# Patient Record
Sex: FEMALE | Race: WHITE
[De-identification: names, ages, dates, MRNs, and addresses within clinical notes are randomized per-mention and may not be internally consistent; named-entity substitution may affect disease eponyms.]

---

## 2017-01-10 ENCOUNTER — HOSPITAL ENCOUNTER (EMERGENCY)
Dept: HOSPITAL 25 - UCCORT | Age: 52
Discharge: HOME | End: 2017-01-10
Payer: COMMERCIAL

## 2017-01-10 VITALS — SYSTOLIC BLOOD PRESSURE: 149 MMHG | DIASTOLIC BLOOD PRESSURE: 97 MMHG

## 2017-01-10 DIAGNOSIS — Z88.7: ICD-10-CM

## 2017-01-10 DIAGNOSIS — J32.9: Primary | ICD-10-CM

## 2017-01-10 DIAGNOSIS — Z87.891: ICD-10-CM

## 2017-01-10 PROCEDURE — 99212 OFFICE O/P EST SF 10 MIN: CPT

## 2017-01-10 PROCEDURE — G0463 HOSPITAL OUTPT CLINIC VISIT: HCPCS

## 2017-01-10 NOTE — UC
Throat Pain/Nasal Jeevan HPI





- HPI Summary


HPI Summary: 





complaint of nasal congestion for approx 9 days


3 daysa go started to have sinus pressure 


for the last 2 days feels like her face is swollen, lip swelling cheek swelling


 her left eye is now watery 


cough has become productive and hurts her chest when she is coughing


 intermittent headache


 hasn't needed to use rescue inhaler during this illness


 denies fever and chills


taking dayquil and ibuprofen benadryl with some relief





- History of Current Complaint


Stated Complaint: SINUSES


Time Seen by Provider: 01/10/17 15:59


Hx Obtained From: Patient


Hx Last Menstrual Period: 2014





- Allergies/Home Medications


Allergies/Adverse Reactions: 


 Allergies











Allergy/AdvReac Type Severity Reaction Status Date / Time


 


Tetanus Toxoid Allergy Severe Anaphylatic Verified 01/10/17 16:03





   Shock  


 


Lactose Intolerance Allergy Mild Hives Verified 01/10/17 16:03











Home Medications: 


 Home Medications





Diphenhydramine HCl [Benadryl Allergy] 25 mg PO Q4H PRN 01/10/17 [History 

Confirmed 01/10/17]


Ranitidine HCl [Zantac] 150 mg PO DAILY 01/10/17 [History Confirmed 01/10/17]











PMH/Surg Hx/FS Hx/Imm Hx


Previously Healthy: No - URI


Endocrine History Of: Reports: Thyroid Disease - Hypothyroidism


Cardiovascular History Of: Reports: Cardiac Disorders - elevated hr, 

Hypertension


Respiratory History Of: Reports: Asthma





- Surgical History


Surgical History: Yes


Surgery Procedure, Year, and Place: T&A. D&C AFTER MISCARRAGE.  esophageal 

biopsy and stretching.





- Family History


Known Family History: Positive: Cardiac Disease, Hypertension


   Negative: Diabetes





- Social History


Occupation: Unemployed


Lives: With Family


Alcohol Use: None


Substance Use Type: None


Smoking Status (MU): Former Smoker


Type: Cigarettes


Amount Used/How Often: "a couple times a year"


Length of Time of Smoking/Using Tobacco: 3 Years


Have You Smoked in the Last Year: Yes


Household Exposure Type: Cigarettes





- Immunization History


Most Recent Influenza Vaccination: Not the 2016/2017 Season


Most Recent Tetanus Shot: allergic





Review of Systems


Constitutional: Negative


Skin: Negative


Eyes: Negative


ENT: Nasal Discharge


Respiratory: Cough


Cardiovascular: Negative


Gastrointestinal: Negative


Genitourinary: Negative


Motor: Negative


Neurovascular: Negative


Musculoskeletal: Negative


Neurological: Headache


Psychological: Negative


All Other Systems Reviewed And Are Negative: Yes





Physical Exam


Triage Information Reviewed: Yes


Appearance: No Pain Distress, Well-Nourished, Ill-Appearing


Vital Signs Reviewed: Yes


Eyes: Positive: Conjunctiva Clear


ENT: Positive: Pharyngeal erythema, Nasal congestion, Nasal drainage, TM bulging

, Other: - left sided maxillary and frontal sinus tenderness.  Negative: TM red


Neck: Positive: Supple, No Lymphadenopathy


Respiratory: Positive: Lungs clear, Normal breath sounds, No respiratory 

distress


Cardiovascular: Positive: RRR, No Murmur, Pulses Normal


Abdomen Description: Positive: Nontender, Soft


Bowel Sounds: Positive: Present


Musculoskeletal: Positive: No Edema


Neurological: Positive: Alert


Psychological Exam: Normal


Skin Exam: Normal





Throat Pain/Nasal Course/Dx





- Course


Course Of Treatment: exam completed.  pt refuses augmentin d/t episode of 

diarrhea in the past





- Differential Dx/Diagnosis


Differential Diagnosis/HQI/PQRI: Sinusitis, URI


Provider Diagnoses: sinusitis





Discharge





- Discharge Plan


Condition: Stable


Disposition: HOME


Prescriptions: 


DOXYcycline CAP(*) [DOXYcycline 100MG CAP(*)] 100 mg PO BID #20 cap


Patient Education Materials:  Sinusitis (ED)


Referrals: 


YOUNG Vargas [Primary Care Provider] - 


Additional Instructions: 


Please take antibiotic as directed.


 Increase fluids and rest


Take acetaminophen for fever or pain


Please review your discharge instructions.


 If your symptoms do not improve please call your primary care provider or 

return to urgent care





SINUSITIS 


What is Sinusitis? 


Sinusitis is inflammation or infection of the lining of the sinuses behind the 

bones in your cheeks or forehead. Sinusitis may occur following a common cold, 

flu, or other infection; allergies; a tooth infection that spreads to the 

sinuses; swimming in contaminated water; pressure changes in airplanes at high 

altitudes; violent sneezing or nose blowing or smoking or breathing other 

peoples smoke. 


Symptoms Might Include: 


 Nasal Congestion 


 Sneezing 


 Watery eyes, eye irritation, or eye itching 


 Headaches 


 Pressure in the cheeks 


 Wheezing 


 Trouble smelling 


 Sore throat and coughing may occur 





Treatment Recommendations: 


 Take medicines as prescribed until completely gone. 


 Drink plenty of fluids. 


 Use saline nose spray to thin the mucous and help the sinuses drain. 


 Use a vaporizer or humidifier. 


 Apply warm compresses to the face or forehead several times a day for 10 to 20 

minutes. 





Call Your Doctor or Return Here IF: 


 Your pain increases during treatment. 


 You develop a high temperature. 


 You develop unusual swelling around the eyes.  You have difficulty with your 

vision. 


 You develop a severe headache, earache, or toothache. 


 You develop increased fever or fever that does not respond to medication such 

as Tylenol?. 


 You have difficulty breathing or catching your breath. 


 You begin to have any other new symptoms that worry you.

## 2017-01-25 ENCOUNTER — HOSPITAL ENCOUNTER (EMERGENCY)
Dept: HOSPITAL 25 - UCCORT | Age: 52
Discharge: HOME | End: 2017-01-25
Payer: COMMERCIAL

## 2017-01-25 VITALS — DIASTOLIC BLOOD PRESSURE: 73 MMHG | SYSTOLIC BLOOD PRESSURE: 156 MMHG

## 2017-01-25 DIAGNOSIS — J45.909: ICD-10-CM

## 2017-01-25 DIAGNOSIS — J06.9: Primary | ICD-10-CM

## 2017-01-25 DIAGNOSIS — Z72.0: ICD-10-CM

## 2017-01-25 PROCEDURE — 99212 OFFICE O/P EST SF 10 MIN: CPT

## 2017-01-25 PROCEDURE — G0463 HOSPITAL OUTPT CLINIC VISIT: HCPCS

## 2017-01-25 NOTE — UC
Asthma HPI





- HPI Summary


HPI Summary: 





states she has asthma, and flaring up today. Notes SOB going up stairs. No 

audible wheezing "but I never get wheezing. They just tell me I have asthma." 

Uses nebs and inhalers prn. REcent course of doxycycline for sinusitis. No 

fever. No ST. No sinus congestion. No recent lung testing to verify diagnosis. 

No hospital admissions.





- History of Current Complaint


Chief Complaint: UCRespiratory


Stated Complaint: TROUBLE BREATHING-ASTHMA


Time Seen by Provider: 01/25/17 17:40


Hx Obtained From: Patient


Hx Last Menstrual Period: ~1.5 yrs


Onset/Duration: Gradual Onset, Lasting Days - 2


Timing: Intermittent Episode Lasting - minutes


Initial Severity: Mild


Current Severity: Mild


Aggravating: Exertion


Alleviating: Rest, Inhalers/Nebulizers





- Risk Factors


Status Asthmaticus Risk Factors: Negative





- Allergy/Home Medications


Allergies/Adverse Reactions: 


 Allergies











Allergy/AdvReac Type Severity Reaction Status Date / Time


 


Tetanus Toxoid Allergy Severe Anaphylatic Verified 01/25/17 17:16





   Shock  


 


Lactose Intolerance Allergy Mild Hives Verified 01/25/17 17:16














PMH/Surg Hx/FS Hx/Imm Hx


Endocrine History Of: Reports: Thyroid Disease - Hypothyroidism


Cardiovascular History Of: Reports: Cardiac Disorders - elevated hr, 

Hypertension


Respiratory History Of: Reports: Asthma





- Surgical History


Surgical History: Yes


Surgery Procedure, Year, and Place: T&A. D&C AFTER MISCARRAGE.  esophageal 

biopsy and stretching.





- Family History


Known Family History: Positive: Cardiac Disease, Hypertension


   Negative: Diabetes





- Social History


Occupation: Employed Part-time


Lives: With Family


Alcohol Use: None


Substance Use Type: None


Smoking Status (MU): Never Smoked Tobacco


Type: Cigarettes


Amount Used/How Often: "a couple times a year"


Length of Time of Smoking/Using Tobacco: 3 Years


Have You Smoked in the Last Year: Yes


Household Exposure Type: Cigarettes





- Immunization History


Most Recent Influenza Vaccination: Not the 2016/2017 Season


Most Recent Tetanus Shot: allergic





Review of Systems


Constitutional: Negative


Skin: Negative


Eyes: Negative


ENT: Negative


Respiratory: Shortness Of Breath, Cough - rare, dry


Cardiovascular: Negative


Gastrointestinal: Negative


Genitourinary: Negative


Motor: Negative


Neurovascular: Negative


Musculoskeletal: Negative


Neurological: Negative


Psychological: Negative


All Other Systems Reviewed And Are Negative: Yes





Physical Exam


Triage Information Reviewed: Yes


Appearance: Well-Appearing, No Pain Distress, Well-Nourished


Vital Signs: 


 Initial Vital Signs











Temp  97.7 F   01/25/17 17:09


 


Pulse  85   01/25/17 17:09


 


Resp  18   01/25/17 17:09


 


BP  156/73   01/25/17 17:09


 


Pulse Ox  97   01/25/17 17:09











Vital Signs Reviewed: Yes


Eye Exam: Normal


ENT Exam: Normal


Neck exam: Normal


Respiratory Exam: Normal


Respiratory: Positive: Lungs clear, Normal breath sounds, No respiratory 

distress, No accessory muscle use


Cardiovascular Exam: Normal


Cardiovascular: Positive: RRR


Musculoskeletal Exam: Normal


Neurological Exam: Normal


Psychological Exam: Normal


Skin Exam: Normal





Asthma Course/Dx





- Differential Dx/Diagnosis


Differential Diagnosis/HQI/PQRI: Acute Asthma, Bronchitis


Provider Diagnoses: URI





Discharge





- Discharge Plan


Condition: Stable


Disposition: HOME


Prescriptions: 


Albuterol HFA INHALER* [Ventolin HFA Inhaler*] 1 - 2 puff INH Q6H PRN #1 mdi


 PRN Reason: Wheezing


Albuterol SYRUP* [Proventyl Syrup*] 4 mg PO QID PRN #200 btl


 PRN Reason: Cough


Levalbuterol 0.63MG/3ML NEB* [Xopenex 0.63MG/3ML NEB*] 0.63 mg INH Q6H PRN #1 

box


 PRN Reason: wheezing, shortness of breath


Patient Education Materials:  Upper Respiratory Infection (ED)


Referrals: 


YOUNG Vargas [Primary Care Provider] -

## 2017-09-01 ENCOUNTER — HOSPITAL ENCOUNTER (EMERGENCY)
Dept: HOSPITAL 25 - UCCORT | Age: 52
Discharge: HOME | End: 2017-09-01
Payer: COMMERCIAL

## 2017-09-01 VITALS — SYSTOLIC BLOOD PRESSURE: 142 MMHG | DIASTOLIC BLOOD PRESSURE: 68 MMHG

## 2017-09-01 DIAGNOSIS — J45.901: Primary | ICD-10-CM

## 2017-09-01 DIAGNOSIS — M79.7: ICD-10-CM

## 2017-09-01 DIAGNOSIS — E03.9: ICD-10-CM

## 2017-09-01 DIAGNOSIS — Z72.0: ICD-10-CM

## 2017-09-01 DIAGNOSIS — I10: ICD-10-CM

## 2017-09-01 DIAGNOSIS — J01.90: ICD-10-CM

## 2017-09-01 PROCEDURE — G0463 HOSPITAL OUTPT CLINIC VISIT: HCPCS

## 2017-09-01 PROCEDURE — 99212 OFFICE O/P EST SF 10 MIN: CPT

## 2017-09-01 NOTE — UC
Respiratory Complaint HPI





- HPI Summary


HPI Summary: 


2 weeks of sinus pain and pressure--now with worsening bronchospastic cough has 

run out of albuterol SNV








- History of Current Complaint


Chief Complaint: UCRespiratory


Stated Complaint: COUGH/ASTHMA


Time Seen by Provider: 09/01/17 11:19


Hx Obtained From: Patient


Hx Last Menstrual Period: ~1.5 yrs


Pregnant?: No


Onset/Duration: Gradual Onset, Lasting Weeks - 2, Worse Since - past fe days 

cough has been worsening


Timing: Constant


Severity Initially: Mild


Severity Currently: Moderate


Pain Intensity: 4


Pain Scale Used: 0-10 Numeric


Character: Cough: Nonproductive


Aggravating Factors: Nothing


Alleviating Factors: Bronchodilator


Associated Signs And Symptoms: Positive: Chills, URI, Nasal Congestion, Sinus 

Discomfort





- Allergies/Home Medications


Allergies/Adverse Reactions: 


 Allergies











Allergy/AdvReac Type Severity Reaction Status Date / Time


 


Tetanus Toxoid Allergy Severe Anaphylatic Verified 09/01/17 11:21





   Shock  


 


Lactose Intolerance Allergy Mild Hives Verified 09/01/17 11:21


 


Latex Allergy  "itchy, Verified 09/01/17 11:21





   red rona"  











Home Medications: 


 Home Medications





Albuterol HFA INHALER* [Ventolin HFA Inhaler*] 1 - 2 puff INH Q4H PRN 09/01/17 [

History Confirmed 09/01/17]


Ibuprofen TAB* [Advil TAB*] 600 mg PO Q6H PRN 09/01/17 [History Confirmed 09/01/ 17]


Levothyroxine TAB* [Synthroid 150 MCG TAB*] 150 mcg PO DAILY 09/01/17 [History 

Confirmed 09/01/17]


Metoprolol Tartrate TAB* [Lopressor TAB*] 25 mg PO DAILY 09/01/17 [History 

Confirmed 09/01/17]


Paroxetine CR (NF) [Paxil Cr (NF)] 25 mg PO DAILY 09/01/17 [History Confirmed 09 /01/17]


Phenylephrine-Acetaminophen-Gu [Mucinex Fast-Max Cold & S 5-325-200 mg] 1 tab 

PO Q4H PRN 09/01/17 [History Confirmed 09/01/17]


Pregabalin CAP(*) [Lyrica CAP(*)] 100 mg PO TID 09/01/17 [History Confirmed 09/ 01/17]











PMH/Surg Hx/FS Hx/Imm Hx


Previously Healthy: No


Endocrine History: Hypothyroidism


Cardiovascular History: Hypertension


Respiratory History: Asthma


Neurological History: Other


Other Neurological History: Fibromyalgia





- Surgical History


Surgical History: Yes


Surgery Procedure, Year, and Place: T&A. D&C AFTER MISCARRAGE.  esophageal 

biopsy and stretching.





- Family History


Known Family History: Positive: Cardiac Disease, Hypertension


   Negative: Diabetes





- Social History


Occupation: Unemployed


Lives: With Family


Alcohol Use: None


Substance Use Type: None


Smoking Status (MU): Former Smoker


Type: Cigarettes


Amount Used/How Often: "a couple times a year"


Length of Time of Smoking/Using Tobacco: 3 Years


Have You Smoked in the Last Year: Yes


Household Exposure Type: Cigarettes





- Immunization History


Most Recent Influenza Vaccination: Not the 2017/208 Season


Most Recent Tetanus Shot: allergic





Review of Systems


Constitutional: Chills, Fatigue


Skin: Negative


Eyes: Negative


ENT: Nasal Discharge, Sinus Congestion, Sinus Pain/Tenderness


Respiratory: Cough


Cardiovascular: Negative


Gastrointestinal: Negative


Genitourinary: Negative


Motor: Negative


Neurovascular: Negative


Musculoskeletal: Negative


Neurological: Headache


Psychological: Negative


All Other Systems Reviewed And Are Negative: Yes





Physical Exam


Triage Information Reviewed: Yes


Appearance: No Pain Distress, Ill-Appearing - mild, Obese


Vital Signs Reviewed: Yes


Eye Exam: Normal


Eyes: Positive: Conjunctiva Clear


ENT Exam: Other


ENT: Positive: Normal ENT inspection, Hearing grossly normal, Pharynx normal, 

Nasal congestion, Nasal drainage.  Negative: TMs normal, Tonsillar swelling, 

Tonsillar exudate, Trismus, Muffled/hoarse voice


Dental Exam: Normal


Neck exam: Normal


Neck: Positive: Supple, Nontender, No Lymphadenopathy


Respiratory Exam: Normal


Respiratory: Positive: Chest non-tender, Lungs clear, Normal breath sounds, No 

respiratory distress, No accessory muscle use


Cardiovascular Exam: Normal


Cardiovascular: Positive: RRR, No Murmur, Pulses Normal, Brisk Capillary Refill


Musculoskeletal Exam: Normal


Musculoskeletal: Positive: Strength Intact, ROM Intact, No Edema


Neurological Exam: Normal


Neurological: Positive: Alert, Muscle Tone Normal


Psychological Exam: Normal


Skin Exam: Normal





Re-Evaluation





- Re-Evaluation


  ** First Eval


Change: Improved - increase airmovement , decrease cough, subjectively feeling 

better as well after neb





Respiratory Course/Dx





- Course


Course Of Treatment: refil Albuterol Neb, augmentin and flonase for sinus 

infection, follow with pcp for blood pressure management





- Differential Dx/Diagnosis


Differential Diagnosis/HQI/PQRI: Asthma, Bronchitis, Exacerbation Of COPD, 

Laryngitis, Sinusitis, Tuberculosis


Provider Diagnoses: Acute exacerbation of bronchospasm, acute rhinosinusitis, 

hypertension in poor control





Discharge





- Discharge Plan


Condition: Stable


Disposition: HOME


Prescriptions: 


Albuterol 2.5MG/3ML (0.083%)* [Ventolin 2.5 MG/3 ML NEB.SOL*] 2.5 mg INH Q6H 

PRN #1 box


 PRN Reason: cough, wheeze


Amoxicillin/Clavulanate TAB* [Augmentin *] 875 mg PO BID #20 tab


Fluticasone NASAL SPRAY 50MCG* [Flonase NASAL SPRAY 50MCG*] 2 spray BOTH NARES 

DAILY #1 btl


Patient Education Materials:  Rhinosinusitis (ED), Hypertension (ED), 

Bronchospasm (ED)


Referrals: 


YOUNG Vargas [Primary Care Provider] - 2 Weeks

## 2018-10-14 ENCOUNTER — HOSPITAL ENCOUNTER (EMERGENCY)
Dept: HOSPITAL 25 - UCCORT | Age: 53
Discharge: HOME | End: 2018-10-14
Payer: COMMERCIAL

## 2018-10-14 VITALS — DIASTOLIC BLOOD PRESSURE: 70 MMHG | SYSTOLIC BLOOD PRESSURE: 141 MMHG

## 2018-10-14 DIAGNOSIS — E07.9: ICD-10-CM

## 2018-10-14 DIAGNOSIS — J02.9: Primary | ICD-10-CM

## 2018-10-14 DIAGNOSIS — Z87.891: ICD-10-CM

## 2018-10-14 PROCEDURE — 99212 OFFICE O/P EST SF 10 MIN: CPT

## 2018-10-14 PROCEDURE — G0463 HOSPITAL OUTPT CLINIC VISIT: HCPCS

## 2018-10-14 PROCEDURE — 87070 CULTURE OTHR SPECIMN AEROBIC: CPT

## 2018-10-14 PROCEDURE — 87651 STREP A DNA AMP PROBE: CPT

## 2018-10-14 NOTE — UC
UC General HPI





- HPI Summary


HPI Summary: 





pt is c/o a sore throat, swollen glands and fever to 103.


onset 2 days ago.





- History of Current Complaint


Chief Complaint: UCGeneralIllness


Stated Complaint: FEVER SORE THROAT


Time Seen by Provider: 10/14/18 12:03


Hx Obtained From: Patient, Family/Caretaker


Hx Last Menstrual Period: ~1.5 yrs


Onset/Duration: Gradual Onset


Timing: Constant


Pain Intensity: 6


Associated Signs & Symptoms: Positive: Fever, Headache





- Allergy/Home Medications


Allergies/Adverse Reactions: 


 Allergies











Allergy/AdvReac Type Severity Reaction Status Date / Time


 


lactose Allergy  Hives Verified 10/14/18 11:43


 


latex Allergy  "Itchy, Verified 10/14/18 11:43





   Red Inocencio"  


 


Tetanus Vaccines and Toxoid Allergy  Anaphylatic Verified 10/14/18 11:43





   Shock  











Home Medications: 


 Home Medications





ASA-APAP-Caffeine Es (Nf) [Excedrin Extra Strength 250-250-65 mg (NF)] 2 tab PO 

Q6H PRN 10/14/18 [History Confirmed 10/14/18]


Levothyroxine TAB* [Synthroid TAB*] 137 mcg PO DAILY 10/14/18 [History 

Confirmed 10/14/18]











PMH/Surg Hx/FS Hx/Imm Hx





- Additional Past Medical History


Additional PMH: 





fibromyalgia


Endocrine History: Thyroid Disease


Cardiovascular History: Hypertension


Psychological History: Anxiety





- Surgical History


Surgical History: Yes


Surgery Procedure, Year, and Place: T&A. D&C AFTER MISCARRAGE.  esophageal 

biopsy and stretching.





- Family History


Known Family History: Positive: Cardiac Disease, Hypertension


   Negative: Diabetes





- Social History


Lives: With Family


Alcohol Use: None


Substance Use Type: None


Smoking Status (MU): Former Smoker


Type: Cigarettes


Amount Used/How Often: "a couple times a year"


Length of Time of Smoking/Using Tobacco: 3 Years


Have You Smoked in the Last Year: Yes


Household Exposure Type: Cigarettes





- Immunization History


Most Recent Influenza Vaccination: Not the 2017/208 Season


Most Recent Tetanus Shot: allergic


Vaccination Up to Date: Yes





Review of Systems


Constitutional: Fever, Fatigue


Skin: Negative


Eyes: Negative


ENT: Sore Throat


Respiratory: Negative


Cardiovascular: Negative


Gastrointestinal: Negative


Genitourinary: Negative


Motor: Negative


Neurovascular: Negative


Musculoskeletal: Myalgia


Neurological: Headache


Psychological: Negative


Is Patient Immunocompromised?: No


All Other Systems Reviewed And Are Negative: Yes





Physical Exam


Triage Information Reviewed: Yes


Appearance: Well-Appearing


Vital Signs: 


 Initial Vital Signs











Temp  96.2 F   10/14/18 11:40


 


Pulse  82   10/14/18 11:40


 


Resp  18   10/14/18 11:40


 


BP  141/70   10/14/18 11:40


 


Pulse Ox  98   10/14/18 11:40











Vital Signs Reviewed: Yes


Eyes: Positive: Conjunctiva Clear


ENT: Positive: Pharyngeal erythema, TMs normal, Uvula midline.  Negative: Nasal 

congestion, Nasal drainage, Trismus, Muffled voice, Hoarse voice


Neck: Positive: Supple, Tenderness @ - peritonsilar nodes, Enlarged Nodes @ - 

peritonsilar-slight


Respiratory: Positive: Lungs clear, Normal breath sounds


Cardiovascular: Positive: RRR, No Murmur


Abdomen Description: Positive: Nontender, No Organomegaly, Soft


Bowel Sounds: Positive: Present


Musculoskeletal: Positive: ROM Intact, No Edema


Neurological: Positive: Alert


Psychological: Positive: Normal Response To Family, Age Appropriate Behavior


Skin Exam: Normal


Skin: Negative: rashes





Diagnostics





- Laboratory


Diagnostic Studies Completed/Ordered: rap[id strep=neg, tc=pending





Course/Dx





- Course


Course Of Treatment: rapid strep=neg, tc=pending. tx supportive at this time.





- Differential Dx - Multi-Symptom


Provider Diagnoses: pharyngitis





Discharge





- Sign-Out/Discharge


Documenting (check all that apply): Patient Departure


All imaging exams completed and their final reports reviewed: No Studies





- Discharge Plan


Condition: Stable


Disposition: HOME


Patient Education Materials:  Pharyngitis (ED)


Referrals: 


AURORA Allen PA [Primary Care Provider] - 7 Days





- Billing Disposition and Condition


Condition: STABLE


Disposition: Home

## 2018-10-17 NOTE — UC
- Progress Note


Progress Note: 





2+ normal gil


no change


[facility] 10/17





Discharge





- Sign-Out/Discharge


Documenting (check all that apply): Post-Discharge Follow Up


All imaging exams completed and their final reports reviewed: No Studies





- Discharge Plan


Condition: Stable


Disposition: HOME


Patient Education Materials:  Pharyngitis (ED)


Referrals: 


AURORA Allen PA [Primary Care Provider] - 7 Days





- Billing Disposition and Condition


Condition: STABLE


Disposition: Home

## 2018-12-07 ENCOUNTER — HOSPITAL ENCOUNTER (EMERGENCY)
Dept: HOSPITAL 25 - UCCORT | Age: 53
Discharge: HOME | End: 2018-12-07
Payer: COMMERCIAL

## 2018-12-07 VITALS — DIASTOLIC BLOOD PRESSURE: 89 MMHG | SYSTOLIC BLOOD PRESSURE: 152 MMHG

## 2018-12-07 DIAGNOSIS — R51: Primary | ICD-10-CM

## 2018-12-07 DIAGNOSIS — B34.9: ICD-10-CM

## 2018-12-07 DIAGNOSIS — Z87.891: ICD-10-CM

## 2018-12-07 DIAGNOSIS — Z88.7: ICD-10-CM

## 2018-12-07 PROCEDURE — 99212 OFFICE O/P EST SF 10 MIN: CPT

## 2018-12-07 PROCEDURE — G0463 HOSPITAL OUTPT CLINIC VISIT: HCPCS

## 2018-12-07 NOTE — UC
UC General HPI





- HPI Summary


HPI Summary: 





Pt presents with c/o of generalized fatigue,  left ear ache, sinus congestion, 

headache,  and pain left side facial pain X 6 weeks.  Pt denies URI symptoms, 

fever, or chills. Pt has hx of fibromyalgia





- History of Current Complaint


Chief Complaint: UCGeneralIllness


Stated Complaint: SINUS PAIN,HEAD ACHE,CONGESTION


Time Seen by Provider: 12/07/18 15:22


Hx Obtained From: Patient


Hx Last Menstrual Period: ~1.5 yrs


Onset/Duration: Gradual Onset, Lasting Weeks, Still Present


Onset Severity: Mild


Current Severity: Mild


Pain Intensity: 3


Associated Signs & Symptoms: Positive: Headache, Weakness





- Allergy/Home Medications


Allergies/Adverse Reactions: 


 Allergies











Allergy/AdvReac Type Severity Reaction Status Date / Time


 


lactose Allergy  Hives Verified 12/07/18 15:03


 


latex Allergy  "Itchy, Verified 12/07/18 15:03





   Red Inocencio"  


 


Tetanus Vaccines and Toxoid Allergy  Anaphylatic Verified 12/07/18 15:03





   Shock  











Home Medications: 


 Home Medications





Ibuprofen TAB* [Advil TAB*] 800 mg PO Q6H PRN 12/07/18 [History Confirmed 12/07/ 18]











PMH/Surg Hx/FS Hx/Imm Hx


Previously Healthy: No - fibromyalgia





- Surgical History


Surgical History: Yes


Surgery Procedure, Year, and Place: T&A. D&C AFTER MISCARRAGE.  esophageal 

biopsy and stretching.





- Family History


Known Family History: Positive: Cardiac Disease, Hypertension


   Negative: Diabetes





- Social History


Occupation: Works From/At Home


Lives: With Family


Alcohol Use: None


Substance Use Type: None


Smoking Status (MU): Former Smoker


Type: Cigarettes


Amount Used/How Often: "a couple times a year"


Length of Time of Smoking/Using Tobacco: 3 Years


Have You Smoked in the Last Year: Yes


Household Exposure Type: Cigarettes





- Immunization History


Most Recent Influenza Vaccination: Not the 2017/208 Season


Most Recent Tetanus Shot: allergic


Vaccination Up to Date: Yes





Review of Systems


All Other Systems Reviewed And Are Negative: Yes


Constitutional: Positive: Fatigue


Skin: Positive: Negative


Eyes: Positive: Negative


ENT: Positive: Ear Ache, Sinus Congestion


Respiratory: Positive: Negative


Cardiovascular: Positive: Negative


Gastrointestinal: Positive: Negative


Genitourinary: Positive: Negative


Motor: Positive: Negative


Neurovascular: Positive: Negative


Musculoskeletal: Positive: Myalgia


Neurological: Positive: Headache


Psychological: Positive: Negative


Is Patient Immunocompromised?: No





Physical Exam


Triage Information Reviewed: Yes


Appearance: Obese


Vital Signs: 


 Initial Vital Signs











Temp  98.8 F   12/07/18 15:05


 


Pulse  93   12/07/18 15:05


 


Resp  19   12/07/18 15:05


 


BP  152/89   12/07/18 15:05


 


Pulse Ox  97   12/07/18 15:05











Vital Signs Reviewed: Yes


Eye Exam: Normal


ENT: Positive: TM bulging - left


Dental Exam: Normal


Neck exam: Normal


Respiratory Exam: Normal


Cardiovascular Exam: Normal


Musculoskeletal Exam: Normal


Neurological Exam: Normal


Psychological Exam: Normal


Skin Exam: Normal





Course/Dx





- Diagnoses


Provider Diagnosis: 


 Viral syndrome, Headache








Discharge





- Sign-Out/Discharge


Documenting (check all that apply): Patient Departure


All imaging exams completed and their final reports reviewed: No Studies





- Discharge Plan


Condition: Stable


Disposition: HOME


Prescriptions: 


Guaifenesin/Pseudoephedrne HCl [Mucinex D -60 mg Tablet] 1 each PO Q12H #

14 tab.er.12h


predniSONE TAB* [Deltasone 20 MG TAB*] 20 mg PO DAILY #4 tab


Patient Education Materials:  General Headache (ED)


Referrals: 


AURORA Allen PA [Primary Care Provider] - As Soon As Possible





- Billing Disposition and Condition


Condition: STABLE


Disposition: Home





- Attestation Statements


Provider Attestation: 





I was available for consult. This patient was seen by the EVANGELISTA. The patient was 

not presented to, seen by, or examined by me. EK